# Patient Record
Sex: FEMALE | Race: WHITE | Employment: UNEMPLOYED | URBAN - METROPOLITAN AREA
[De-identification: names, ages, dates, MRNs, and addresses within clinical notes are randomized per-mention and may not be internally consistent; named-entity substitution may affect disease eponyms.]

---

## 2017-01-11 RX ORDER — ROPINIROLE 2 MG/1
2 TABLET, FILM COATED ORAL 4 TIMES DAILY
Qty: 360 TAB | Refills: 1 | Status: SHIPPED | OUTPATIENT
Start: 2017-01-11 | End: 2017-04-11

## 2017-01-11 RX ORDER — TOPIRAMATE 50 MG/1
TABLET, FILM COATED ORAL
Qty: 180 TAB | Refills: 1 | Status: SHIPPED | OUTPATIENT
Start: 2017-01-11 | End: 2021-11-16

## 2017-01-11 NOTE — TELEPHONE ENCOUNTER
The pt had her mail order pharmacy call the office for 90 day prescriptions for the pt's requip and topamax. A chart review shows that the pt was issued prescriptions in December for both medications but they were done for 30 days plus refills. With the change of the new year her insurance is requesting these meds to be done as 90 day scripts. If approved, the medications will go over to the pharmacy electronically.

## 2017-02-16 ENCOUNTER — DOCUMENTATION ONLY (OUTPATIENT)
Dept: PAIN MANAGEMENT | Age: 62
End: 2017-02-16

## 2017-02-16 NOTE — PROGRESS NOTES
Request for records from [facility] and fax request to [employer] to be process on 02/16/2017.

## 2017-03-15 ENCOUNTER — OFFICE VISIT (OUTPATIENT)
Dept: PAIN MANAGEMENT | Age: 62
End: 2017-03-15

## 2017-03-15 VITALS — DIASTOLIC BLOOD PRESSURE: 67 MMHG | RESPIRATION RATE: 19 BRPM | SYSTOLIC BLOOD PRESSURE: 140 MMHG | HEART RATE: 83 BPM

## 2017-03-15 DIAGNOSIS — M96.1 POSTLAMINECTOMY SYNDROME, LUMBAR REGION: ICD-10-CM

## 2017-03-15 DIAGNOSIS — G89.4 CHRONIC PAIN SYNDROME: Primary | ICD-10-CM

## 2017-03-15 DIAGNOSIS — M54.2 CHRONIC NECK PAIN: ICD-10-CM

## 2017-03-15 DIAGNOSIS — M50.30 DEGENERATION OF CERVICAL INTERVERTEBRAL DISC: ICD-10-CM

## 2017-03-15 DIAGNOSIS — F11.90 CHRONIC, CONTINUOUS USE OF OPIOIDS: ICD-10-CM

## 2017-03-15 DIAGNOSIS — G89.29 CHRONIC MIDLINE LOW BACK PAIN, WITH SCIATICA PRESENCE UNSPECIFIED: ICD-10-CM

## 2017-03-15 DIAGNOSIS — Z98.890 STATUS POST LUMBAR LAMINECTOMY: ICD-10-CM

## 2017-03-15 DIAGNOSIS — M43.26 FUSION OF SPINE OF LUMBAR REGION: ICD-10-CM

## 2017-03-15 DIAGNOSIS — M47.812 FACET ARTHRITIS OF CERVICAL REGION: ICD-10-CM

## 2017-03-15 DIAGNOSIS — G89.29 CHRONIC NECK PAIN: ICD-10-CM

## 2017-03-15 DIAGNOSIS — Z79.899 ENCOUNTER FOR LONG-TERM (CURRENT) USE OF MEDICATIONS: ICD-10-CM

## 2017-03-15 DIAGNOSIS — G03.9 ARACHNOIDITIS: ICD-10-CM

## 2017-03-15 DIAGNOSIS — M54.5 CHRONIC MIDLINE LOW BACK PAIN, WITH SCIATICA PRESENCE UNSPECIFIED: ICD-10-CM

## 2017-03-15 DIAGNOSIS — M51.37 DEGENERATION OF LUMBAR OR LUMBOSACRAL INTERVERTEBRAL DISC: ICD-10-CM

## 2017-03-15 LAB
ALCOHOL UR POC: NORMAL
AMPHETAMINES UR POC: NEGATIVE
BARBITURATES UR POC: NEGATIVE
BENZODIAZEPINES UR POC: NEGATIVE
BUPRENORPHINE UR POC: NORMAL
CANNABINOIDS UR POC: NEGATIVE
CARISOPRODOL UR POC: NORMAL
COCAINE UR POC: NEGATIVE
FENTANYL UR POC: NORMAL
MDMA/ECSTASY UR POC: NEGATIVE
METHADONE UR POC: NEGATIVE
METHAMPHETAMINE UR POC: NEGATIVE
METHYLPHENIDATE UR POC: NEGATIVE
OPIATES UR POC: NEGATIVE
OXYCODONE UR POC: NEGATIVE
PHENCYCLIDINE UR POC: NEGATIVE
PROPOXYPHENE UR POC: NORMAL
TRAMADOL UR POC: NORMAL
TRICYCLICS UR POC: NEGATIVE

## 2017-03-15 RX ORDER — OXYMORPHONE HYDROCHLORIDE 5 MG/1
TABLET ORAL
Qty: 150 TAB | Refills: 0 | Status: SHIPPED | OUTPATIENT
Start: 2017-03-15 | End: 2017-03-15 | Stop reason: SDUPTHER

## 2017-03-15 RX ORDER — OXYMORPHONE HYDROCHLORIDE 5 MG/1
TABLET ORAL
Qty: 150 TAB | Refills: 0 | Status: SHIPPED | OUTPATIENT
Start: 2017-04-14 | End: 2017-05-11 | Stop reason: SDUPTHER

## 2017-03-15 NOTE — PROGRESS NOTES
HISTORY OF PRESENT ILLNESS  Teresa Zavaleta is a 57 y.o. female. HPI Comments: Visit survey reviewed  Chief complaint low back pain with symptoms in the both legs  Chronic pain  She has been working on using less and less of the opioid pain medication. She wants to continue to taper down and would like to do this quickly if at all possible. She is fearful of withdrawal symptoms at the same time however. I have explained the plan and my recommendations and she agrees. She has been using extended release oxymorphone 20 mg twice a day  Oxymorphone 5 mg as needed. Most recently this has just been 1 every evening. Moving forward I will change the 5 mg oxymorphone to 1, 5 times a day as needed. She still has some remaining extended release oxymorphone, she will use this once a day until she is out. The short acting will then give her some control to use this as needed for pain and also decrease withdrawal symptoms. Instead of following up in 3 months I recommend and she agrees, follow-up 2 months. Cymbalta, Requip, Topamax, baclofen prescribed by her primary care physician    Back Pain    Associated symptoms include leg pain. Pertinent negatives include no chest pain, no fever and no dysuria. Leg Pain    Associated symptoms include back pain. Pertinent negatives include no itching. Review of Systems   Constitutional: Negative for chills and fever. HENT: Negative for ear discharge and ear pain. Eyes: Negative for pain and discharge. Respiratory: Negative for cough and hemoptysis. Cardiovascular: Negative for chest pain and leg swelling. Gastrointestinal: Negative for blood in stool and melena. Genitourinary: Negative for dysuria and hematuria. Musculoskeletal: Positive for back pain. Skin: Negative for itching and rash. Neurological: Negative for seizures and loss of consciousness. Psychiatric/Behavioral: Negative for substance abuse and suicidal ideas.        Physical Exam Constitutional: She appears well-developed and well-nourished. She is cooperative. She does not have a sickly appearance. HENT:   Head: Normocephalic and atraumatic. Right Ear: External ear normal. No drainage. Left Ear: External ear normal. No drainage. Nose: Nose normal.   Eyes: Lids are normal. Right eye exhibits no discharge. Left eye exhibits no discharge. Right conjunctiva has no hemorrhage. Left conjunctiva has no hemorrhage. Neck: Neck supple. No tracheal deviation present. No thyroid mass present. Pulmonary/Chest: Effort normal. No respiratory distress. Neurological: She is alert. No cranial nerve deficit. Skin: Skin is intact. No rash noted. She is not diaphoretic. Psychiatric: Her speech is normal and behavior is normal. Thought content normal. Her mood appears not anxious. Her affect is not angry. She does not express inappropriate judgment. She does not exhibit a depressed mood. Nursing note and vitals reviewed. ASSESSMENT and PLAN  Encounter Diagnoses   Name Primary?  Chronic pain syndrome Yes    Status post lumbar laminectomy     Encounter for long-term (current) use of medications     Arachnoiditis     Chronic, continuous use of opioids     Degeneration of cervical intervertebral disc     Chronic neck pain     Facet arthritis of cervical region     Fusion of spine of lumbar region     Postlaminectomy syndrome, lumbar region     Chronic midline low back pain, with sciatica presence unspecified     Degeneration of lumbar intervertebral disc    No signs of addiction or diversion. The primary Dxes. ,including pain are controlled. Pain/Pain control/Meds and Quality Of Life have been reviewed. Nonpharmacologic therapy and non-opioid pharmacologic therapy are always considered. If opioid therapy is prescribed, this is only if the expected benefits for both pain and function are anticipated to outweigh risks.   Possible changes to treatment plan considered. Support/education given as needed. Today-medications are as listed. changes to medications. Follow up -- 2 months.  --Urine test or oral swab today. Also, the prescription monitoring program was reviewed today. The majority of today's visit was spent counseling and coordinating care. Total visit time-40 minutes. -Dragon medical dictation software was used for portions of this report. Unintended errors may occur.

## 2017-03-15 NOTE — PROGRESS NOTES
Nursing Notes    Patient presents to the office today in follow-up. Reviewed medications with counts as follows:    Rx Date filled Qty Dispensed Pill Count Last Dose Short   Oxymorphone er 20 mg 2/16/17 60 17 today no   Oxymorphone 5 mg 2/16/17 60 146 yesterday no   Ms. Juliana Carbajal has a reminder for a \"due or due soon\" health maintenance. I have asked that she contact her primary care provider for follow-up on this health maintenance. Comments: patient returned opana 5 mg with start date of 2/18/17 and oxymorphone er 20 mg prescription with start date of 2/20/17 to be destroyed    POC UDS was performed in office today    Any new labs or imaging since last appointment? YES  Labs    Have you been to an emergency room (ER) or urgent care clinic since your last visit? NO            Have you been hospitalized since your last visit? NO     If yes, where, when, and reason for visit? Have you seen or consulted any other health care providers outside of the [address] [place] [address]  since your last visit?   YES     If yes, where, when, and reason for visit?   [facility]  Dr Katrin Amaya- opthalmology

## 2017-05-11 ENCOUNTER — OFFICE VISIT (OUTPATIENT)
Dept: PAIN MANAGEMENT | Age: 62
End: 2017-05-11

## 2017-05-11 VITALS — RESPIRATION RATE: 19 BRPM | DIASTOLIC BLOOD PRESSURE: 67 MMHG | HEART RATE: 70 BPM | SYSTOLIC BLOOD PRESSURE: 141 MMHG

## 2017-05-11 DIAGNOSIS — M96.1 POSTLAMINECTOMY SYNDROME, LUMBAR REGION: ICD-10-CM

## 2017-05-11 DIAGNOSIS — M51.37 DEGENERATION OF LUMBAR OR LUMBOSACRAL INTERVERTEBRAL DISC: ICD-10-CM

## 2017-05-11 DIAGNOSIS — G89.4 CHRONIC PAIN SYNDROME: ICD-10-CM

## 2017-05-11 DIAGNOSIS — G03.9 ARACHNOIDITIS: ICD-10-CM

## 2017-05-11 DIAGNOSIS — M62.838 MUSCLE SPASMS OF LOWER EXTREMITY, UNSPECIFIED LATERALITY: ICD-10-CM

## 2017-05-11 DIAGNOSIS — M54.5 CHRONIC MIDLINE LOW BACK PAIN, WITH SCIATICA PRESENCE UNSPECIFIED: Primary | ICD-10-CM

## 2017-05-11 DIAGNOSIS — Z98.890 STATUS POST LUMBAR LAMINECTOMY: ICD-10-CM

## 2017-05-11 DIAGNOSIS — G89.29 CHRONIC MIDLINE LOW BACK PAIN, WITH SCIATICA PRESENCE UNSPECIFIED: Primary | ICD-10-CM

## 2017-05-11 DIAGNOSIS — M47.812 FACET ARTHRITIS OF CERVICAL REGION: ICD-10-CM

## 2017-05-11 DIAGNOSIS — M50.30 DEGENERATION OF CERVICAL INTERVERTEBRAL DISC: ICD-10-CM

## 2017-05-11 DIAGNOSIS — M43.26 FUSION OF SPINE OF LUMBAR REGION: ICD-10-CM

## 2017-05-11 RX ORDER — OXYMORPHONE HYDROCHLORIDE 5 MG/1
TABLET ORAL
Qty: 150 TAB | Refills: 0 | Status: SHIPPED | OUTPATIENT
Start: 2017-07-09 | End: 2018-10-08

## 2017-05-11 RX ORDER — OXYMORPHONE HYDROCHLORIDE 5 MG/1
TABLET ORAL
Qty: 150 TAB | Refills: 0 | Status: SHIPPED | OUTPATIENT
Start: 2017-06-10 | End: 2017-05-11

## 2017-05-11 RX ORDER — OXYMORPHONE HYDROCHLORIDE 5 MG/1
TABLET ORAL
Qty: 150 TAB | Refills: 0 | Status: SHIPPED | OUTPATIENT
Start: 2017-05-11 | End: 2017-05-11 | Stop reason: SDUPTHER

## 2017-05-11 RX ORDER — NALOXONE HYDROCHLORIDE 4 MG/.1ML
4 SPRAY NASAL AS NEEDED
Qty: 1 BOX | Refills: 0 | Status: SHIPPED | OUTPATIENT
Start: 2017-05-11 | End: 2018-10-08

## 2017-05-11 NOTE — PROGRESS NOTES
HISTORY OF PRESENT ILLNESS  Phuong Regan is a 57 y.o. female. HPI Comments: Meds help with pain control and quality of life. No new side effects reported today. Visit survey reviewed and will be scanned.  reviewed. Recent average level of pain(out of 10)-9  Chief complaint low back pain with leg symptoms, also some neck pain  Chronic pain  Previously removed away from the extended release oxymorphone. Most recently, oxymorphone 5 mg 5 times a day as needed  She would like to keep the oxymorphone where it is now, possibly further decrease in the future. Baclofen, Topamax, diclofenac cream, Cymbalta, promethazine, hematochezia prescribed by her primary care physician  She reports increased feelings of numbness to the right leg, increased issues with muscle spasms. She is going to follow-up with the neurosurgeon. She would like to return back to every 3 month follow-ups with our clinic. Measuring clinical outcomes of chronic pain patients. This was reviewed today. The survey will be scanned. Please see the survey for details. Total score-9      Review of Systems   Constitutional: Negative for chills and fever. HENT: Negative for ear discharge and ear pain. Eyes: Negative for pain and discharge. Respiratory: Negative for cough and hemoptysis. Cardiovascular: Negative for chest pain and leg swelling. Gastrointestinal: Negative for blood in stool and melena. Genitourinary: Negative for dysuria and hematuria. Musculoskeletal: Positive for back pain. Skin: Negative for itching and rash. Neurological: Negative for seizures and loss of consciousness. Psychiatric/Behavioral: Negative for substance abuse and suicidal ideas. Physical Exam   Constitutional: She appears well-developed and well-nourished. She is cooperative. She does not have a sickly appearance. HENT:   Head: Normocephalic and atraumatic. Right Ear: External ear normal. No drainage.    Left Ear: External ear normal. No drainage. Nose: Nose normal.   Eyes: Lids are normal. Right eye exhibits no discharge. Left eye exhibits no discharge. Right conjunctiva has no hemorrhage. Left conjunctiva has no hemorrhage. Neck: Neck supple. No tracheal deviation present. No thyroid mass present. Pulmonary/Chest: Effort normal. No respiratory distress. Neurological: She is alert. No cranial nerve deficit. Skin: Skin is intact. No rash noted. She is not diaphoretic. Psychiatric: Her speech is normal and behavior is normal. Thought content normal. Her mood appears not anxious. Her affect is not angry. She does not express inappropriate judgment. She does not exhibit a depressed mood. Nursing note and vitals reviewed. ASSESSMENT and PLAN  Encounter Diagnoses   Name Primary?  Chronic midline low back pain, with sciatica presence unspecified Yes    Postlaminectomy syndrome, lumbar region     Degeneration of lumbar intervertebral disc     Fusion of spine of lumbar region     Facet arthritis of cervical region [facility])     Degeneration of cervical intervertebral disc     Chronic pain syndrome     Status post lumbar laminectomy     Arachnoiditis     Muscle spasms of lower extremity, unspecified laterality    No indicators for recent medication misuse.  reviewed. Pain Meds and Quality Of Life have been reviewed. Nonpharmacologic therapy and non-opioid pharmacologic therapy were considered. If opioid therapy is prescribed, this is only if the expected benefits are anticipated to outweigh risks. Possible changes to treatment plan considered. Support/education given as needed. Today-medications are as listed. No significant changes to medications. Follow up -- 3 months. Because the patient's current regimen places him/her at increased risk for possible overdose, a prescription for naloxone is being provided.   The patient understands that this medication is only to be used in the setting of a possible overdose and that inadvertent use of this medication could precipitate overt withdrawal.  I discussed naloxone with the patient today. In addition, the patient has received the naloxone instruction sheet.

## 2017-05-11 NOTE — MR AVS SNAPSHOT
Visit Information Date & Time Provider Department Dept. Phone Encounter #  
 5/11/2017  2:30 PM Talya Carballo MD [address] [place] [address] for Pain Management [id number] Follow-up Instructions Return in about 3 months (around 8/11/2017). Follow-up and Disposition History Upcoming Health Maintenance Date Due Hepatitis C Screening 1955 MICROALBUMIN Q1 6/21/1964 Pneumococcal 19-64 Medium Risk (1 of 1 - PPSV23) 6/21/1973 DTaP/Tdap/Td series (1 - Tdap) 6/21/1975 FOBT Q 1 YEAR AGE 50-75 6/21/2004 EYE EXAM RETINAL OR DILATED Q1 4/1/2014 ZOSTER VACCINE AGE 60> 6/21/2014 HEMOGLOBIN A1C Q6M 1/19/2015 FOOT EXAM Q1 2/14/2015 LIPID PANEL Q1 7/25/2015 BREAST CANCER SCRN MAMMOGRAM 9/24/2015 PAP AKA CERVICAL CYTOLOGY 9/24/2016 INFLUENZA AGE 9 TO ADULT 8/1/2017 Allergies as of 5/11/2017  Review Complete On: 5/11/2017 By: Talya Carballo MD  
  
 Severity Noted Reaction Type Reactions Latex    Unknown (comments) Adhesive Tape-silicones  07/74/5286    Other (comments) Fentanyl  02/21/2012   Systemic Hives Pt states allergic to adhesive backing not medication Lucentis [Ranibizumab]  04/05/2012    Hives Nucynta [Tapentadol]  11/28/2011    Hives Pcn [Penicillins]    Hives Current Immunizations  Never Reviewed No immunizations on file. Not reviewed this visit You Were Diagnosed With   
  
 Codes Comments Chronic midline low back pain, with sciatica presence unspecified    -  Primary ICD-10-CM: M54.5, G89.29 ICD-9-CM: 724.2, 338.29 Postlaminectomy syndrome, lumbar region     ICD-10-CM: M96.1 ICD-9-CM: 722.83 Degeneration of lumbar or lumbosacral intervertebral disc     ICD-10-CM: M51.37 
ICD-9-CM: 722.52 Fusion of spine of lumbar region     ICD-10-CM: M43.26 
ICD-9-CM: 724.9 Facet arthritis of cervical region [facility])     ICD-10-CM: M46.92 
ICD-9-CM: 721.0 Degeneration of cervical intervertebral disc     ICD-10-CM: M50.30 ICD-9-CM: 722.4 Chronic pain syndrome     ICD-10-CM: G89.4 ICD-9-CM: 338.4 Status post lumbar laminectomy     ICD-10-CM: Z98.890 ICD-9-CM: V45.89 Arachnoiditis     ICD-10-CM: G03.9 ICD-9-CM: 322.9 Muscle spasms of lower extremity, unspecified laterality     ICD-10-CM: X16.946 ICD-9-CM: 728.85 Vitals BP Pulse Resp OB Status Smoking Status 141/67 79 19 Hysterectomy Never Smoker Vitals History Preferred Pharmacy Pharmacy Name Phone [address] [facility] [phone] Your Updated Medication List  
  
   
This list is accurate as of: 5/11/17  3:07 PM.  Always use your most recent med list.  
  
  
  
  
 Back Brace Misc  
1 Units by Does Not Apply route daily as needed. Back brace to help with back pain and posture       history lumbar degenerative joint disease, lumbar pain, lumbar fusion CYMBALTA 60 mg capsule Generic drug:  DULoxetine Take 60 mg by mouth daily. DEXILANT 60 mg Cpdb Generic drug:  Dexlansoprazole Take 60 mg by mouth Daily (before breakfast). diclofenac 1 % Gel Commonly known as:  VOLTAREN Apply 4 g to affected area four (4) times daily. estradiol 0.01 % (0.1 mg/gram) vaginal cream  
Commonly known as:  ESTRACE Apply to vagina on Mon-Wed and Fri  
  
 folic acid 1 mg tablet Commonly known as:  Google Take 2 Tabs by mouth daily. linaclotide 290 mcg Cap capsule Commonly known as:  Charmayne Guile Take 290 mcg by mouth Daily (before breakfast). naloxone 4 mg/actuation Spry 4 mg by Nasal route as needed. * oxyMORphone 5 mg tablet Commonly known as:  OPANA Take 1 Tab by mouth two (2) times daily as needed for Pain. Max Daily Amount: 10 mg.  
  
 * oxyMORphone 20 mg ER tablet Commonly known as:  OPANA ER  
 Take 1 Tab by mouth every twelve (12) hours. Max Daily Amount: 40 mg.  
  
 * oxyMORphone 20 mg ER tablet Commonly known as:  OPANA ER Take 1 Tab by mouth every twelve (12) hours. Max Daily Amount: 40 mg.  
  
 * oxyMORphone 20 mg ER tablet Commonly known as:  OPANA ER Take 1 Tab by mouth every twelve (12) hours. Max Daily Amount: 40 mg.  
  
 * oxyMORphone 5 mg tablet Commonly known as:  OPANA  
1  5x/day  prn Start taking on:  2017 * promethazine 25 mg tablet Commonly known as:  PHENERGAN Take 1 tablet by mouth three (3) times daily as needed for Nausea. * promethazine 25 mg tablet Commonly known as:  PHENERGAN Take 1 Tab by mouth three (3) times daily as needed for Nausea. rOPINIRole 2 mg tablet Commonly known as:  Kriste Ruse Take 1 Tab by mouth four (4) times daily. simvastatin 10 mg tablet Commonly known as:  ZOCOR Take 10 mg by mouth nightly. SYNTHROID 137 mcg tablet Generic drug:  levothyroxine Take  by mouth Daily (before breakfast). temazepam 30 mg capsule Commonly known as:  RESTORIL Take 1 Cap by mouth nightly as needed for Sleep. TENS Units Pattricia Range Commonly known as:  TENS 502  
1 Device by Does Not Apply route daily as needed for Pain. to use daily, as needed, for low back pain and radicular leg symptoms tiZANidine 4 mg tablet Commonly known as:  Dawson Campbell Take 1 Tab by mouth every six (6) hours as needed. topiramate 50 mg tablet Commonly known as:  TOPAMAX TAKE ONE TABLET BY MOUTH TWO TIMES A DAY WELLBUTRIN PO Take  by mouth. * Notice: This list has 7 medication(s) that are the same as other medications prescribed for you. Read the directions carefully, and ask your doctor or other care provider to review them with you. Prescriptions Printed Refills  
 naloxone 4 mg/actuation spry 0 Si mg by Nasal route as needed. Class: Print  Route: Nasal  
 oxyMORphone (OPANA) 5 mg tablet 0 Starting on: 2017 Si  5x/day  prn  
 Class: Print Follow-up Instructions Return in about 3 months (around 2017). Introducing [facility] [place] [facility] SERVICES! Dear Yolanda Bolaños: 
Thank you for requesting a [employer] account. Our records indicate that you already have an active [employer] account. You can access your account anytime at [email or web address] Did you know that you can access your hospital and ER discharge instructions at any time in [employer]? You can also review all of your test results from your hospital stay or ER visit. Additional Information If you have questions, please visit the Frequently Asked Questions section of the [employer] website at [email or web address] Remember, [employer] is NOT to be used for urgent needs. For medical emergencies, dial 911. Now available from your iPhone and Android! Please provide this summary of care documentation to your next provider. Your primary care clinician is listed as Alisson Nunes. If you have any questions after today's visit, please call [phone].

## 2017-05-11 NOTE — PROGRESS NOTES
Nursing Notes    Patient presents to the office today in follow-up. Reviewed medications with counts as follows:    Rx Date filled Qty Dispensed Pill Count Last Dose Short   Oxymorphone 5 mg 4/18/17 150 127 today no   Ms. Saran Gonzales has a reminder for a \"due or due soon\" health maintenance. I have asked that she contact her primary care provider for follow-up on this health maintenance. POC UDS was not performed in office today    Any new labs or imaging since last appointment? YES  Chest xray  Labs  MRI of lower back   Mammogram     Have you been to an emergency room (ER) or urgent care clinic since your last visit? YES          [facility]. Dx with pneumonia     Have you been hospitalized since your last visit? NO     If yes, where, when, and reason for visit? Have you seen or consulted any other health care providers outside of the [address] [place] [address]  since your last visit? YES     If yes, where, when, and reason for visit?

## 2020-07-15 PROBLEM — E11.21 DIABETIC NEPHROPATHY ASSOCIATED WITH TYPE 2 DIABETES MELLITUS (HCC): Status: ACTIVE | Noted: 2019-04-27

## 2020-07-15 PROBLEM — N39.46 MIXED STRESS AND URGE URINARY INCONTINENCE: Status: ACTIVE | Noted: 2020-07-15

## 2020-07-15 PROBLEM — E11.42 DIABETIC POLYNEUROPATHY ASSOCIATED WITH TYPE 2 DIABETES MELLITUS (HCC): Status: ACTIVE | Noted: 2020-06-20
